# Patient Record
Sex: MALE | Employment: STUDENT | URBAN - METROPOLITAN AREA
[De-identification: names, ages, dates, MRNs, and addresses within clinical notes are randomized per-mention and may not be internally consistent; named-entity substitution may affect disease eponyms.]

---

## 2023-06-29 ENCOUNTER — EMPLOYEE HEALTH (OUTPATIENT)
Dept: OTHER | Facility: HOSPITAL | Age: 23
End: 2023-06-29
Attending: PREVENTIVE MEDICINE

## 2023-06-29 DIAGNOSIS — Z11.1 SCREENING-PULMONARY TB: Primary | ICD-10-CM

## 2023-06-29 PROCEDURE — 86480 TB TEST CELL IMMUN MEASURE: CPT

## 2023-07-03 LAB
M TB IFN-G CD4+ T-CELLS BLD-ACNC: 0 IU/ML
M TB TUBERC IFN-G BLD QL: NEGATIVE
M TB TUBERC IGNF/MITOGEN IGNF CONTROL: >10 IU/ML
QFT TB1 AG MINUS NIL: 0.01 IU/ML
QFT TB2 AG MINUS NIL: 0.01 IU/ML

## 2023-07-27 ENCOUNTER — TELEPHONE (OUTPATIENT)
Dept: INTERNAL MEDICINE CLINIC | Facility: HOSPITAL | Age: 23
End: 2023-07-27

## 2023-07-27 ENCOUNTER — LAB ENCOUNTER (OUTPATIENT)
Dept: LAB | Facility: HOSPITAL | Age: 23
End: 2023-07-27
Attending: PREVENTIVE MEDICINE
Payer: COMMERCIAL

## 2023-07-27 DIAGNOSIS — Z20.822 SUSPECTED COVID-19 VIRUS INFECTION: ICD-10-CM

## 2023-07-27 DIAGNOSIS — Z20.822 SUSPECTED COVID-19 VIRUS INFECTION: Primary | ICD-10-CM

## 2023-07-27 LAB — SARS-COV-2 RNA RESP QL NAA+PROBE: DETECTED

## 2023-07-27 NOTE — TELEPHONE ENCOUNTER
[x] [facility]  []RAMIRO   [] [address] [facility]  Manager : Berna Gonzalez    HAVE YOU RECEIVED THE COVID-19 Vaccine? Yes [x]    No []          If yes, date(s) received:    9/7/21; 10/9/21; 6/9/22        Which vaccine:  Pfizer []     Cheryl Buitrago [x]    J&J []      SYMPTOMS (reported via dashboard):  [] asymptomatic  [x] symptomatic  [] GI symptoms only    Symptom onset date: 7/23  Fever   > 100F             Yes []      Cough                          Yes []      Shortness of breath  Yes []      Congestion                 Yes [x]      Runny nose                Yes []        Loss of Smell              Yes []        Loss of Taste             Yes []       Sore throat                 Yes [x]       Fatigue                        Yes []       Body Aches                Yes [x]        Chills                           Yes []        Headache                   Yes []             GI symptoms             Yes []     No [x]                     Nausea   []          Vomiting            []                                    Diarrhea  []          Upset stomach []      Employee has positive COVID Exposure? Yes []     No [x]    Date of exposure:     []  Coworker                       [] patient                        [] Family/friend    Employee has a history of Covid?   Yes [x]     No []   If Yes, when: December 2020    When was the last shift you worked?: 7/26      PLAN:     COVID-19 testing ordered: [x] Rapid    [] [facility]              Date test is to be taken:   7/27    []  Outside testing                           Notes:    INSTRUCTIONS PROVIDED:    [x]  Employee was instructed to call Central scheduling at [phone] or use [email or web address] to make an appointment for their testing and once at the site remain in their vehicle and call [phone] to register for the appointment  [x]  May return to work if employee views negative result in [address] and remains fever, vomiting, and diarrhea free  []  May continue to work if remains asymptomatic and views negative result in [facility]  []  Follow up for condition update when resulting  []  If symptoms develop, stay home and call hotline for rapid test order  []  If COVID positive results, off work minimum of 5 days from positive test or onset of symptoms (day 0)    []      On day 5, if asymptomatic or mildly symptomatic (with improving symptoms) may return to work day 6  []      On day 5, if symptomatic, call Employee Health for RTW screening        [x]  Plan noted above  [x]  Length of time to obtain results  []  Quarantine instructions  []  S/S of worsening infection/condition and importance of prompt medical re-evaluation including when to seek emergency care.    [x] The employee voiced understanding

## 2023-07-28 NOTE — TELEPHONE ENCOUNTER
07/28: Left vm on number that was indicated as cell phone. Asked employee to return phone call to give RTW instructions. My chart message sent, manager email sent, spreadsheet updated.

## 2023-07-28 NOTE — TELEPHONE ENCOUNTER
Results and RTW guidelines:    COVID RESULT:    [] Viewed by employee in [address] RTW plan and instructions as indicated on triage call. Manager notified. Estimated RTW date:   [] Discussed with employee   [x] Unable to reach by phone. Sent via [employer] message      Test type:    [x] Rapid         [] [facility]         [] Outside test:     [x] Positive     - Employee should quarantine at home for at least 5 days (day 1 is day after sx onset) , follow the CDC guidelines for cleaning and                              quarantining; see CDC.gov   -This employee may RTW on day 6 if asymptomatic or mildly symptomatic (with improving symptoms). Call Employee Health on day 5 if unable to return on day 6 after                      symptom onset.    -This employee needs to call Employee Health on day 5 after symptom onset. The employee needs to be cleared by Employee Health. - If employee is still experiencing severe symptoms on day 5 must make a RTW appt with Employee Health, Employee will not be cleared if:    1. Has consistent cough, shortness of breath or fatigue that restricts your physical activities    2. Is still feeling \"unwell\"    3. Within 15 days of hospitalization for COVID    4. Within 20 days of intubation for COVID    5.  Still has a fever, vomiting or diarrhea   - Keep communication open with management about RTW and if symptoms worsen  - Monitor symptoms and temperature                 - Notify PCP of result                 - Seek emergent care with worsening symptoms                - If outside testing completed, bring a copy of result to RTW appointment           Notes:     RTW PLAN:    [x]  If COVID positive results, off work minimum of 5 days from positive test or onset of symptoms (day 0)        On day 5, if asymptomatic or mildly symptomatic (with improving symptoms) may return to work day 6          On day 5, if symptomatic, call Employee Health for RTW screening        []  COVID positive result - call Employee Health on day 5 after symptom onset. The employee needs to be cleared by Employee Health to RTW. [] RTW immediately, continue to monitor for sx  [] RTW when sx improve; must be fever free for 24 hours w/o medications, Diarrhea/Vomiting free for 24 hours w/o medications  [] [facility] ordered; continue to monitor sx and call for new/worsening sx.   Discuss RTW guidelines with manager  [] May continue to work  [x] Follow up with PCP  [] Home until further instruction from hotline with [facility] results  INSTRUCTIONS PROVIDED:  [x]  Plan as noted above  []  Length of time to obtain results   [x]  Quarantine instructions  [x]  Masking protocol   [x]  S/S of worsening infection/condition and importance of prompt medical re-evaluation including when to seek emergency care  [] If symptoms develop, stay home and call hotline for rapid test order    Estimated RTW date:  07/29/2023    [] The employee voiced understanding of above plan/instructions  [x] Manager Notified

## 2023-07-30 NOTE — TELEPHONE ENCOUNTER
7/30 called employee, no answer. Left vm with rtw date and asked to call [facility] hotline if  any question.

## 2024-12-04 ENCOUNTER — IMMUNIZATION (OUTPATIENT)
Dept: FAMILY MEDICINE CLINIC | Facility: CLINIC | Age: 24
End: 2024-12-04
Payer: COMMERCIAL

## 2024-12-04 DIAGNOSIS — Z23 NEED FOR INFLUENZA VACCINATION: Primary | ICD-10-CM

## 2024-12-04 PROCEDURE — 90471 IMMUNIZATION ADMIN: CPT | Performed by: PHYSICIAN ASSISTANT

## 2024-12-04 PROCEDURE — 90656 IIV3 VACC NO PRSV 0.5 ML IM: CPT | Performed by: PHYSICIAN ASSISTANT

## 2025-01-02 ENCOUNTER — TELEPHONE (OUTPATIENT)
Dept: INTERNAL MEDICINE CLINIC | Facility: HOSPITAL | Age: 25
End: 2025-01-02

## 2025-01-02 DIAGNOSIS — Z20.822 SUSPECTED COVID-19 VIRUS INFECTION: Primary | ICD-10-CM

## 2025-01-03 ENCOUNTER — LAB ENCOUNTER (OUTPATIENT)
Dept: LAB | Facility: HOSPITAL | Age: 25
End: 2025-01-03
Attending: PREVENTIVE MEDICINE

## 2025-01-03 DIAGNOSIS — Z20.822 SUSPECTED COVID-19 VIRUS INFECTION: ICD-10-CM

## 2025-01-03 LAB — SARS-COV-2 RNA RESP QL NAA+PROBE: NOT DETECTED
